# Patient Record
Sex: MALE | Race: ASIAN | URBAN - METROPOLITAN AREA
[De-identification: names, ages, dates, MRNs, and addresses within clinical notes are randomized per-mention and may not be internally consistent; named-entity substitution may affect disease eponyms.]

---

## 2021-12-29 ENCOUNTER — WEB ENCOUNTER (OUTPATIENT)
Dept: URBAN - METROPOLITAN AREA CLINIC 23 | Facility: CLINIC | Age: 41
End: 2021-12-29

## 2022-02-10 ENCOUNTER — WEB ENCOUNTER (OUTPATIENT)
Dept: URBAN - METROPOLITAN AREA CLINIC 23 | Facility: CLINIC | Age: 42
End: 2022-02-10

## 2022-02-10 ENCOUNTER — OFFICE VISIT (OUTPATIENT)
Dept: URBAN - METROPOLITAN AREA CLINIC 23 | Facility: CLINIC | Age: 42
End: 2022-02-10
Payer: COMMERCIAL

## 2022-02-10 ENCOUNTER — DASHBOARD ENCOUNTERS (OUTPATIENT)
Age: 42
End: 2022-02-10

## 2022-02-10 DIAGNOSIS — D50.8 ACQUIRED IRON DEFICIENCY ANEMIA DUE TO DECREASED ABSORPTION: ICD-10-CM

## 2022-02-10 PROCEDURE — 99204 OFFICE O/P NEW MOD 45 MIN: CPT | Performed by: INTERNAL MEDICINE

## 2022-02-10 PROCEDURE — 99244 OFF/OP CNSLTJ NEW/EST MOD 40: CPT | Performed by: INTERNAL MEDICINE

## 2022-02-10 RX ORDER — FENOFIBRATE 120 MG/1
1 TABLET WITH FOOD TABLET ORAL ONCE A DAY
Status: ACTIVE | COMMUNITY

## 2022-02-10 RX ORDER — ATORVASTATIN CALCIUM 10 MG/1
1 TABLET TABLET, FILM COATED ORAL ONCE A DAY
Status: ACTIVE | COMMUNITY

## 2022-02-10 RX ORDER — METFORMIN HYDROCHLORIDE 500 MG/1
1 TABLET WITH A MEAL TABLET, FILM COATED ORAL ONCE A DAY
Status: ACTIVE | COMMUNITY

## 2022-02-10 RX ORDER — MINOCYCLINE HYDROCHLORIDE 100 MG/1
1 CAPSULE CAPSULE ORAL
Status: ACTIVE | COMMUNITY

## 2022-02-10 RX ORDER — CALCIUM CARBONATE 500(1250)
1 TABLET WITH MEALS TABLET ORAL TWICE A DAY
Status: ACTIVE | COMMUNITY

## 2022-02-10 NOTE — HPI-TODAY'S VISIT:
41M referred by Dr Girma Chambers for GI eval for anemia.  A copy of this document will be sent to the referring physician. saw dr chambers. pt says dr chambers thinks pt may be having slow chr bld loss in the gi tract he denies any overt GI bleeding no nsaid use no abd surgery no abd pain/N/V. feels fine

## 2022-04-11 ENCOUNTER — OFFICE VISIT (OUTPATIENT)
Dept: URBAN - METROPOLITAN AREA SURGERY CENTER 15 | Facility: SURGERY CENTER | Age: 42
End: 2022-04-11

## 2022-04-18 ENCOUNTER — CLAIMS CREATED FROM THE CLAIM WINDOW (OUTPATIENT)
Dept: URBAN - METROPOLITAN AREA SURGERY CENTER 15 | Facility: SURGERY CENTER | Age: 42
End: 2022-04-18

## 2022-04-18 ENCOUNTER — CLAIMS CREATED FROM THE CLAIM WINDOW (OUTPATIENT)
Dept: URBAN - METROPOLITAN AREA SURGERY CENTER 15 | Facility: SURGERY CENTER | Age: 42
End: 2022-04-18
Payer: COMMERCIAL

## 2022-04-18 ENCOUNTER — CLAIMS CREATED FROM THE CLAIM WINDOW (OUTPATIENT)
Dept: URBAN - METROPOLITAN AREA CLINIC 4 | Facility: CLINIC | Age: 42
End: 2022-04-18
Payer: COMMERCIAL

## 2022-04-18 DIAGNOSIS — K63.89 BACTERIAL OVERGROWTH SYNDROME: ICD-10-CM

## 2022-04-18 DIAGNOSIS — K21.9 ACID REFLUX: ICD-10-CM

## 2022-04-18 DIAGNOSIS — K63.5 BENIGN COLON POLYP: ICD-10-CM

## 2022-04-18 DIAGNOSIS — K21.9 GASTRO-ESOPHAGEAL REFLUX DISEASE WITHOUT ESOPHAGITIS: ICD-10-CM

## 2022-04-18 DIAGNOSIS — K62.1 ANAL AND RECTAL POLYP: ICD-10-CM

## 2022-04-18 DIAGNOSIS — K63.89 CYST OF DUODENUM: ICD-10-CM

## 2022-04-18 DIAGNOSIS — K31.89 FOCAL FOVEOLAR HYPERPLASIA: ICD-10-CM

## 2022-04-18 DIAGNOSIS — D50.9 ANEMIA: ICD-10-CM

## 2022-04-18 PROCEDURE — 43239 EGD BIOPSY SINGLE/MULTIPLE: CPT | Performed by: INTERNAL MEDICINE

## 2022-04-18 PROCEDURE — 45380 COLONOSCOPY AND BIOPSY: CPT | Performed by: INTERNAL MEDICINE

## 2022-04-18 PROCEDURE — 88305 TISSUE EXAM BY PATHOLOGIST: CPT | Performed by: PATHOLOGY

## 2022-04-18 PROCEDURE — G8907 PT DOC NO EVENTS ON DISCHARG: HCPCS | Performed by: INTERNAL MEDICINE

## 2022-04-18 PROCEDURE — 88342 IMHCHEM/IMCYTCHM 1ST ANTB: CPT | Performed by: PATHOLOGY

## 2022-04-18 PROCEDURE — 88312 SPECIAL STAINS GROUP 1: CPT | Performed by: PATHOLOGY

## 2022-04-18 RX ORDER — FENOFIBRATE 120 MG/1
1 TABLET WITH FOOD TABLET ORAL ONCE A DAY
Status: ACTIVE | COMMUNITY

## 2022-04-18 RX ORDER — METFORMIN HYDROCHLORIDE 500 MG/1
1 TABLET WITH A MEAL TABLET, FILM COATED ORAL ONCE A DAY
Status: ACTIVE | COMMUNITY

## 2022-04-18 RX ORDER — MINOCYCLINE HYDROCHLORIDE 100 MG/1
1 CAPSULE CAPSULE ORAL
Status: ACTIVE | COMMUNITY

## 2022-04-18 RX ORDER — ATORVASTATIN CALCIUM 10 MG/1
1 TABLET TABLET, FILM COATED ORAL ONCE A DAY
Status: ACTIVE | COMMUNITY

## 2022-04-18 RX ORDER — CALCIUM CARBONATE 500(1250)
1 TABLET WITH MEALS TABLET ORAL TWICE A DAY
Status: ACTIVE | COMMUNITY

## 2022-05-02 ENCOUNTER — TELEPHONE ENCOUNTER (OUTPATIENT)
Dept: URBAN - METROPOLITAN AREA CLINIC 49 | Facility: CLINIC | Age: 42
End: 2022-05-02

## 2022-05-02 ENCOUNTER — WEB ENCOUNTER (OUTPATIENT)
Dept: URBAN - METROPOLITAN AREA CLINIC 23 | Facility: CLINIC | Age: 42
End: 2022-05-02

## 2022-05-03 ENCOUNTER — WEB ENCOUNTER (OUTPATIENT)
Dept: URBAN - METROPOLITAN AREA CLINIC 23 | Facility: CLINIC | Age: 42
End: 2022-05-03

## 2022-05-10 ENCOUNTER — WEB ENCOUNTER (OUTPATIENT)
Dept: URBAN - METROPOLITAN AREA CLINIC 23 | Facility: CLINIC | Age: 42
End: 2022-05-10

## 2022-05-11 ENCOUNTER — LAB OUTSIDE AN ENCOUNTER (OUTPATIENT)
Dept: URBAN - METROPOLITAN AREA CLINIC 23 | Facility: CLINIC | Age: 42
End: 2022-05-11

## 2022-05-11 ENCOUNTER — WEB ENCOUNTER (OUTPATIENT)
Dept: URBAN - METROPOLITAN AREA CLINIC 23 | Facility: CLINIC | Age: 42
End: 2022-05-11

## 2022-05-11 ENCOUNTER — TELEPHONE ENCOUNTER (OUTPATIENT)
Dept: URBAN - METROPOLITAN AREA CLINIC 23 | Facility: CLINIC | Age: 42
End: 2022-05-11

## 2022-05-12 ENCOUNTER — TELEPHONE ENCOUNTER (OUTPATIENT)
Dept: URBAN - METROPOLITAN AREA CLINIC 49 | Facility: CLINIC | Age: 42
End: 2022-05-12

## 2022-06-02 ENCOUNTER — OFFICE VISIT (OUTPATIENT)
Dept: URBAN - METROPOLITAN AREA CLINIC 23 | Facility: CLINIC | Age: 42
End: 2022-06-02

## 2022-06-17 PROBLEM — 87522002 IRON DEFICIENCY ANEMIA: Status: ACTIVE | Noted: 2022-02-10

## 2022-08-08 ENCOUNTER — WEB ENCOUNTER (OUTPATIENT)
Dept: URBAN - METROPOLITAN AREA CLINIC 23 | Facility: CLINIC | Age: 42
End: 2022-08-08

## 2022-08-09 ENCOUNTER — WEB ENCOUNTER (OUTPATIENT)
Dept: URBAN - METROPOLITAN AREA CLINIC 23 | Facility: CLINIC | Age: 42
End: 2022-08-09

## 2022-08-11 ENCOUNTER — OFFICE VISIT (OUTPATIENT)
Dept: URBAN - METROPOLITAN AREA CLINIC 23 | Facility: CLINIC | Age: 42
End: 2022-08-11

## 2022-08-15 ENCOUNTER — OFFICE VISIT (OUTPATIENT)
Dept: URBAN - METROPOLITAN AREA CLINIC 22 | Facility: CLINIC | Age: 42
End: 2022-08-15

## 2022-09-09 ENCOUNTER — WEB ENCOUNTER (OUTPATIENT)
Dept: URBAN - METROPOLITAN AREA CLINIC 23 | Facility: CLINIC | Age: 42
End: 2022-09-09

## 2022-09-12 ENCOUNTER — OFFICE VISIT (OUTPATIENT)
Dept: URBAN - METROPOLITAN AREA CLINIC 22 | Facility: CLINIC | Age: 42
End: 2022-09-12
Payer: COMMERCIAL

## 2022-09-12 DIAGNOSIS — D50.9 ANEMIA: ICD-10-CM

## 2022-09-12 PROCEDURE — 91110 GI TRC IMG INTRAL ESOPH-ILE: CPT | Performed by: INTERNAL MEDICINE

## 2022-09-19 ENCOUNTER — WEB ENCOUNTER (OUTPATIENT)
Dept: URBAN - METROPOLITAN AREA CLINIC 23 | Facility: CLINIC | Age: 42
End: 2022-09-19

## 2022-09-21 ENCOUNTER — TELEPHONE ENCOUNTER (OUTPATIENT)
Dept: URBAN - METROPOLITAN AREA CLINIC 92 | Facility: CLINIC | Age: 42
End: 2022-09-21

## 2022-10-29 ENCOUNTER — WEB ENCOUNTER (OUTPATIENT)
Dept: URBAN - METROPOLITAN AREA CLINIC 23 | Facility: CLINIC | Age: 42
End: 2022-10-29